# Patient Record
Sex: MALE | Race: WHITE | URBAN - METROPOLITAN AREA
[De-identification: names, ages, dates, MRNs, and addresses within clinical notes are randomized per-mention and may not be internally consistent; named-entity substitution may affect disease eponyms.]

---

## 2017-01-13 ENCOUNTER — APPOINTMENT (RX ONLY)
Dept: URBAN - METROPOLITAN AREA CLINIC 144 | Facility: CLINIC | Age: 67
Setting detail: DERMATOLOGY
End: 2017-01-13

## 2017-01-13 DIAGNOSIS — L82.1 OTHER SEBORRHEIC KERATOSIS: ICD-10-CM

## 2017-01-13 DIAGNOSIS — D22 MELANOCYTIC NEVI: ICD-10-CM

## 2017-01-13 DIAGNOSIS — D18.0 HEMANGIOMA: ICD-10-CM

## 2017-01-13 PROBLEM — D23.62 OTHER BENIGN NEOPLASM OF SKIN OF LEFT UPPER LIMB, INCLUDING SHOULDER: Status: ACTIVE | Noted: 2017-01-13

## 2017-01-13 PROBLEM — D48.5 NEOPLASM OF UNCERTAIN BEHAVIOR OF SKIN: Status: ACTIVE | Noted: 2017-01-13

## 2017-01-13 PROBLEM — D22.71 MELANOCYTIC NEVI OF RIGHT LOWER LIMB, INCLUDING HIP: Status: ACTIVE | Noted: 2017-01-13

## 2017-01-13 PROBLEM — D22.5 MELANOCYTIC NEVI OF TRUNK: Status: ACTIVE | Noted: 2017-01-13

## 2017-01-13 PROBLEM — D18.01 HEMANGIOMA OF SKIN AND SUBCUTANEOUS TISSUE: Status: ACTIVE | Noted: 2017-01-13

## 2017-01-13 PROCEDURE — 11100: CPT

## 2017-01-13 PROCEDURE — 99213 OFFICE O/P EST LOW 20 MIN: CPT | Mod: 25

## 2017-01-13 PROCEDURE — ? COUNSELING

## 2017-01-13 PROCEDURE — ? BIOPSY BY SHAVE METHOD

## 2017-01-13 ASSESSMENT — LOCATION SIMPLE DESCRIPTION DERM
LOCATION SIMPLE: RIGHT THIGH
LOCATION SIMPLE: CHEST
LOCATION SIMPLE: RIGHT UPPER BACK
LOCATION SIMPLE: ABDOMEN

## 2017-01-13 ASSESSMENT — LOCATION DETAILED DESCRIPTION DERM
LOCATION DETAILED: RIGHT ANTERIOR MEDIAL PROXIMAL THIGH
LOCATION DETAILED: RIGHT LATERAL SUPERIOR CHEST
LOCATION DETAILED: LEFT LATERAL INFERIOR CHEST
LOCATION DETAILED: RIGHT SUPERIOR UPPER BACK
LOCATION DETAILED: EPIGASTRIC SKIN

## 2017-01-13 ASSESSMENT — LOCATION ZONE DERM
LOCATION ZONE: LEG
LOCATION ZONE: TRUNK

## 2017-01-13 NOTE — PROCEDURE: BIOPSY BY SHAVE METHOD
Consent: Verbal consent was obtained and risks were reviewed including but not limited to scarring, infection, bleeding, scabbing, incomplete removal, nerve damage and allergy to anesthesia.
Wound Care: Vaseline
Size Of Lesion In Cm: 0
Lab: [id number]
Billing Type: Third-Party Bill
Notification Instructions: Patient will be notified of biopsy results. However, patient instructed to call the office if not contacted within 2 weeks.
Dressing: bandage
Electrodesiccation Text: The wound bed was treated with electrodesiccation after the biopsy was performed.
Biopsy Method: Dermablade
Destruction After The Procedure: Yes
Cryotherapy Text: The wound bed was treated with cryotherapy after the biopsy was performed.
Lab Facility: [id number]
Detail Level: Detailed
Anesthesia Volume In Cc (Will Not Render If 0): 1
Post-Care Instructions: I reviewed with the patient in detail post-care instructions. Patient is to keep the biopsy site dry overnight, and then apply vaseline twice daily until healed.
Silver Nitrate Text: The wound bed was treated with silver nitrate after the biopsy was performed.
Biopsy Type: H and E
Anesthesia Type: 1% lidocaine with epinephrine
Hemostasis: Aluminum Chloride
Electrodesiccation And Curettage Text: The wound bed was treated with electrodesiccation and curettage after the biopsy was performed.
Type Of Destruction Used: Curettage
Bill 10606 For Specimen Handling/Conveyance To Laboratory?: no

## 2017-03-06 VITALS
SYSTOLIC BLOOD PRESSURE: 146 MMHG | DIASTOLIC BLOOD PRESSURE: 86 MMHG | SYSTOLIC BLOOD PRESSURE: 146 MMHG | DIASTOLIC BLOOD PRESSURE: 86 MMHG | DIASTOLIC BLOOD PRESSURE: 86 MMHG | DIASTOLIC BLOOD PRESSURE: 86 MMHG | SYSTOLIC BLOOD PRESSURE: 146 MMHG | DIASTOLIC BLOOD PRESSURE: 86 MMHG | SYSTOLIC BLOOD PRESSURE: 146 MMHG | DIASTOLIC BLOOD PRESSURE: 86 MMHG | DIASTOLIC BLOOD PRESSURE: 86 MMHG | DIASTOLIC BLOOD PRESSURE: 86 MMHG | SYSTOLIC BLOOD PRESSURE: 146 MMHG | DIASTOLIC BLOOD PRESSURE: 86 MMHG | DIASTOLIC BLOOD PRESSURE: 86 MMHG | SYSTOLIC BLOOD PRESSURE: 146 MMHG | SYSTOLIC BLOOD PRESSURE: 146 MMHG | SYSTOLIC BLOOD PRESSURE: 146 MMHG | DIASTOLIC BLOOD PRESSURE: 86 MMHG | SYSTOLIC BLOOD PRESSURE: 146 MMHG | SYSTOLIC BLOOD PRESSURE: 146 MMHG | DIASTOLIC BLOOD PRESSURE: 86 MMHG | SYSTOLIC BLOOD PRESSURE: 146 MMHG | SYSTOLIC BLOOD PRESSURE: 146 MMHG | SYSTOLIC BLOOD PRESSURE: 146 MMHG | SYSTOLIC BLOOD PRESSURE: 146 MMHG | SYSTOLIC BLOOD PRESSURE: 146 MMHG | SYSTOLIC BLOOD PRESSURE: 146 MMHG | SYSTOLIC BLOOD PRESSURE: 146 MMHG | SYSTOLIC BLOOD PRESSURE: 146 MMHG | SYSTOLIC BLOOD PRESSURE: 146 MMHG | DIASTOLIC BLOOD PRESSURE: 86 MMHG | DIASTOLIC BLOOD PRESSURE: 86 MMHG | DIASTOLIC BLOOD PRESSURE: 86 MMHG | DIASTOLIC BLOOD PRESSURE: 86 MMHG | DIASTOLIC BLOOD PRESSURE: 86 MMHG | DIASTOLIC BLOOD PRESSURE: 86 MMHG | SYSTOLIC BLOOD PRESSURE: 146 MMHG | DIASTOLIC BLOOD PRESSURE: 86 MMHG | DIASTOLIC BLOOD PRESSURE: 86 MMHG | SYSTOLIC BLOOD PRESSURE: 146 MMHG | DIASTOLIC BLOOD PRESSURE: 86 MMHG | SYSTOLIC BLOOD PRESSURE: 146 MMHG | SYSTOLIC BLOOD PRESSURE: 146 MMHG | DIASTOLIC BLOOD PRESSURE: 86 MMHG | DIASTOLIC BLOOD PRESSURE: 86 MMHG

## 2017-06-29 ENCOUNTER — HOSPITAL ENCOUNTER (OUTPATIENT)
Dept: HOSPITAL 61 - PMGWOUND | Age: 67
Discharge: HOME | End: 2017-06-29
Attending: EMERGENCY MEDICINE
Payer: MEDICARE

## 2017-06-29 DIAGNOSIS — I70.232: Primary | ICD-10-CM

## 2017-06-29 DIAGNOSIS — Z72.89: ICD-10-CM

## 2017-06-29 DIAGNOSIS — L97.211: ICD-10-CM

## 2017-06-29 DIAGNOSIS — Z87.891: ICD-10-CM

## 2017-06-29 DIAGNOSIS — Z85.46: ICD-10-CM

## 2017-06-29 DIAGNOSIS — J45.909: ICD-10-CM

## 2017-06-29 DIAGNOSIS — Z86.718: ICD-10-CM

## 2017-06-29 DIAGNOSIS — K21.9: ICD-10-CM

## 2017-06-29 PROCEDURE — 97597 DBRDMT OPN WND 1ST 20 CM/<: CPT

## 2017-07-06 ENCOUNTER — HOSPITAL ENCOUNTER (OUTPATIENT)
Dept: HOSPITAL 61 - PMGWOUND | Age: 67
Discharge: HOME | End: 2017-07-06
Attending: EMERGENCY MEDICINE
Payer: MEDICARE

## 2017-07-06 DIAGNOSIS — K21.9: ICD-10-CM

## 2017-07-06 DIAGNOSIS — L97.211: ICD-10-CM

## 2017-07-06 DIAGNOSIS — Z85.46: ICD-10-CM

## 2017-07-06 DIAGNOSIS — Z86.718: ICD-10-CM

## 2017-07-06 DIAGNOSIS — Z72.89: ICD-10-CM

## 2017-07-06 DIAGNOSIS — I87.311: Primary | ICD-10-CM

## 2017-07-06 DIAGNOSIS — J45.909: ICD-10-CM

## 2017-07-06 DIAGNOSIS — Z87.891: ICD-10-CM

## 2017-07-06 PROCEDURE — 97597 DBRDMT OPN WND 1ST 20 CM/<: CPT

## 2017-07-13 ENCOUNTER — HOSPITAL ENCOUNTER (OUTPATIENT)
Dept: HOSPITAL 61 - PMGWOUND | Age: 67
Discharge: HOME | End: 2017-07-13
Attending: EMERGENCY MEDICINE
Payer: MEDICARE

## 2017-07-13 DIAGNOSIS — Z85.46: ICD-10-CM

## 2017-07-13 DIAGNOSIS — Z72.89: ICD-10-CM

## 2017-07-13 DIAGNOSIS — Z87.891: ICD-10-CM

## 2017-07-13 DIAGNOSIS — L97.211: ICD-10-CM

## 2017-07-13 DIAGNOSIS — K21.9: ICD-10-CM

## 2017-07-13 DIAGNOSIS — I87.311: Primary | ICD-10-CM

## 2017-07-13 DIAGNOSIS — J45.909: ICD-10-CM

## 2017-07-13 DIAGNOSIS — Z86.718: ICD-10-CM

## 2017-07-13 PROCEDURE — 11100: CPT

## 2017-07-13 PROCEDURE — 88305 TISSUE EXAM BY PATHOLOGIST: CPT

## 2017-07-14 NOTE — PATHOLOGY
PATHOLOGY REPORT 

 

*    *    *    *    *    *    *    * 

 FINAL DIAGNOSIS:

Skin "right lower leg wound":

- Chronic stasis dermatitis, irritated.

- There is no evidence of malignancy.

-  see comment.

 

COMMENT:

This case was also reviewed by the dermatopathologist Dr. Suki Cox.  

([facility]:pit; 2017)

 

 

REPORT ELECTRONICALLY SIGNED BY:   Rojas Barajas M.D.

DATE/TIME:   2017 13:49

*    *    *    *    *    *    *    *

 

GROSS PATHOLOGY:

Received in formalin labeled "Marty Price, right lower leg biopsy,"

is a punch biopsy measuring 0.3 x 0.3 x 0.4 cm in greatest

dimensions.  The epidermal surface gray tan and flat.  The margin is

inked, and the specimen is entirely submitted in cassette A1.

([facility]; 17)

 

 

 INITIAL CPT CODE(S):

A; 16713

Professional services performed by [facility] at 

[place] [address]

 

  Technical services performed by [facility] at [address] [place] [address]

[address] [address].

  

 SPECIMEN(S) RECEIVED:

A.Right lower leg wound

 

 CLINICAL HISTORY:

None Provided

 

 PATIENT:  MARTY PRICE

/AGE:  1950 (Age: 66)  

PATIENT #:  [id number]

ACCESSION #:  [id number]          ALT CASE #:   

SPECIMEN COLLECTION DATE:  2017

SPECIMEN RECEIVED DATE:  2017

   

[facility] - [address] [place] [address] - PHONE: 

[phone]

* * *  END OF REPORT  * * *

## 2017-07-18 ENCOUNTER — HOSPITAL ENCOUNTER (OUTPATIENT)
Dept: HOSPITAL 61 - PMGWOUND | Age: 67
Discharge: HOME | End: 2017-07-18
Attending: EMERGENCY MEDICINE
Payer: MEDICARE

## 2017-07-18 DIAGNOSIS — I87.311: Primary | ICD-10-CM

## 2017-07-18 DIAGNOSIS — Z87.891: ICD-10-CM

## 2017-07-18 DIAGNOSIS — L97.211: ICD-10-CM

## 2017-07-18 DIAGNOSIS — Z85.46: ICD-10-CM

## 2017-07-18 DIAGNOSIS — K21.9: ICD-10-CM

## 2017-07-18 DIAGNOSIS — Z72.89: ICD-10-CM

## 2017-07-18 DIAGNOSIS — J45.909: ICD-10-CM

## 2017-07-18 DIAGNOSIS — Z86.718: ICD-10-CM

## 2017-07-18 PROCEDURE — 11042 DBRDMT SUBQ TIS 1ST 20SQCM/<: CPT

## 2017-08-15 ENCOUNTER — HOSPITAL ENCOUNTER (OUTPATIENT)
Dept: HOSPITAL 61 - PMGWOUND | Age: 67
Discharge: HOME | End: 2017-08-15
Attending: EMERGENCY MEDICINE
Payer: MEDICARE

## 2017-08-15 DIAGNOSIS — K21.9: ICD-10-CM

## 2017-08-15 DIAGNOSIS — Z87.891: ICD-10-CM

## 2017-08-15 DIAGNOSIS — Z86.718: ICD-10-CM

## 2017-08-15 DIAGNOSIS — L97.211: ICD-10-CM

## 2017-08-15 DIAGNOSIS — J45.909: ICD-10-CM

## 2017-08-15 DIAGNOSIS — Z72.89: ICD-10-CM

## 2017-08-15 DIAGNOSIS — I87.311: Primary | ICD-10-CM

## 2017-08-15 PROCEDURE — 11042 DBRDMT SUBQ TIS 1ST 20SQCM/<: CPT

## 2017-08-22 ENCOUNTER — HOSPITAL ENCOUNTER (OUTPATIENT)
Dept: HOSPITAL 61 - PMGWOUND | Age: 67
Discharge: HOME | End: 2017-08-22
Attending: EMERGENCY MEDICINE
Payer: MEDICARE

## 2017-08-22 DIAGNOSIS — L97.211: ICD-10-CM

## 2017-08-22 DIAGNOSIS — K21.9: ICD-10-CM

## 2017-08-22 DIAGNOSIS — I87.311: Primary | ICD-10-CM

## 2017-08-22 DIAGNOSIS — Z85.46: ICD-10-CM

## 2017-08-22 DIAGNOSIS — J45.909: ICD-10-CM

## 2017-08-22 DIAGNOSIS — Z87.891: ICD-10-CM

## 2017-08-22 DIAGNOSIS — Z86.718: ICD-10-CM

## 2017-08-22 DIAGNOSIS — Z72.89: ICD-10-CM

## 2017-08-22 PROCEDURE — 97597 DBRDMT OPN WND 1ST 20 CM/<: CPT

## 2017-08-29 ENCOUNTER — HOSPITAL ENCOUNTER (OUTPATIENT)
Dept: HOSPITAL 61 - PMGWOUND | Age: 67
Discharge: HOME | End: 2017-08-29
Attending: EMERGENCY MEDICINE
Payer: MEDICARE

## 2017-08-29 DIAGNOSIS — I87.311: Primary | ICD-10-CM

## 2017-08-29 DIAGNOSIS — L97.211: ICD-10-CM

## 2017-08-29 DIAGNOSIS — Z87.891: ICD-10-CM

## 2017-08-29 DIAGNOSIS — J45.909: ICD-10-CM

## 2017-08-29 DIAGNOSIS — Z86.718: ICD-10-CM

## 2017-08-29 DIAGNOSIS — Z72.89: ICD-10-CM

## 2017-08-29 DIAGNOSIS — K21.9: ICD-10-CM

## 2017-08-29 DIAGNOSIS — Z85.46: ICD-10-CM

## 2017-08-29 PROCEDURE — 11042 DBRDMT SUBQ TIS 1ST 20SQCM/<: CPT

## 2017-09-05 ENCOUNTER — HOSPITAL ENCOUNTER (OUTPATIENT)
Dept: HOSPITAL 61 - PMGWOUND | Age: 67
Discharge: HOME | End: 2017-09-05
Attending: EMERGENCY MEDICINE
Payer: MEDICARE

## 2017-09-05 DIAGNOSIS — L97.211: ICD-10-CM

## 2017-09-05 DIAGNOSIS — I87.311: Primary | ICD-10-CM

## 2017-09-05 DIAGNOSIS — Z72.89: ICD-10-CM

## 2017-09-05 DIAGNOSIS — K21.9: ICD-10-CM

## 2017-09-05 DIAGNOSIS — Z87.891: ICD-10-CM

## 2017-09-05 DIAGNOSIS — Z85.46: ICD-10-CM

## 2017-09-05 DIAGNOSIS — J45.909: ICD-10-CM

## 2017-09-05 DIAGNOSIS — Z86.718: ICD-10-CM

## 2017-09-05 PROCEDURE — 97597 DBRDMT OPN WND 1ST 20 CM/<: CPT

## 2017-09-12 ENCOUNTER — HOSPITAL ENCOUNTER (OUTPATIENT)
Dept: HOSPITAL 61 - PMGWOUND | Age: 67
Discharge: HOME | End: 2017-09-12
Attending: EMERGENCY MEDICINE
Payer: MEDICARE

## 2017-09-12 DIAGNOSIS — L97.211: ICD-10-CM

## 2017-09-12 DIAGNOSIS — K21.9: ICD-10-CM

## 2017-09-12 DIAGNOSIS — J45.909: ICD-10-CM

## 2017-09-12 DIAGNOSIS — Z86.718: ICD-10-CM

## 2017-09-12 DIAGNOSIS — Z87.891: ICD-10-CM

## 2017-09-12 DIAGNOSIS — Z72.89: ICD-10-CM

## 2017-09-12 DIAGNOSIS — I87.311: Primary | ICD-10-CM

## 2017-09-12 DIAGNOSIS — Z85.46: ICD-10-CM

## 2017-09-12 PROCEDURE — 97597 DBRDMT OPN WND 1ST 20 CM/<: CPT

## 2017-09-19 ENCOUNTER — HOSPITAL ENCOUNTER (OUTPATIENT)
Dept: HOSPITAL 61 - PMGWOUND | Age: 67
Discharge: HOME | End: 2017-09-19
Attending: EMERGENCY MEDICINE
Payer: MEDICARE

## 2017-09-19 DIAGNOSIS — K21.9: ICD-10-CM

## 2017-09-19 DIAGNOSIS — Z86.718: ICD-10-CM

## 2017-09-19 DIAGNOSIS — I87.311: Primary | ICD-10-CM

## 2017-09-19 DIAGNOSIS — Z72.89: ICD-10-CM

## 2017-09-19 DIAGNOSIS — Z85.46: ICD-10-CM

## 2017-09-19 DIAGNOSIS — L97.211: ICD-10-CM

## 2017-09-19 DIAGNOSIS — Z87.891: ICD-10-CM

## 2017-09-19 DIAGNOSIS — J45.909: ICD-10-CM

## 2017-09-19 PROCEDURE — 99214 OFFICE O/P EST MOD 30 MIN: CPT

## 2017-09-26 ENCOUNTER — HOSPITAL ENCOUNTER (OUTPATIENT)
Dept: HOSPITAL 61 - PMGWOUND | Age: 67
Discharge: HOME | End: 2017-09-26
Attending: EMERGENCY MEDICINE
Payer: MEDICARE

## 2017-09-26 DIAGNOSIS — J45.909: ICD-10-CM

## 2017-09-26 DIAGNOSIS — Z86.718: ICD-10-CM

## 2017-09-26 DIAGNOSIS — Z85.46: ICD-10-CM

## 2017-09-26 DIAGNOSIS — K21.9: ICD-10-CM

## 2017-09-26 DIAGNOSIS — Z87.891: ICD-10-CM

## 2017-09-26 DIAGNOSIS — I87.311: Primary | ICD-10-CM

## 2017-09-26 DIAGNOSIS — L97.211: ICD-10-CM

## 2017-09-26 PROCEDURE — 97597 DBRDMT OPN WND 1ST 20 CM/<: CPT

## 2017-10-03 ENCOUNTER — HOSPITAL ENCOUNTER (OUTPATIENT)
Dept: HOSPITAL 61 - PMGWOUND | Age: 67
Discharge: HOME | End: 2017-10-03
Attending: EMERGENCY MEDICINE
Payer: MEDICARE

## 2017-10-03 DIAGNOSIS — K21.9: ICD-10-CM

## 2017-10-03 DIAGNOSIS — Z86.718: ICD-10-CM

## 2017-10-03 DIAGNOSIS — Z72.89: ICD-10-CM

## 2017-10-03 DIAGNOSIS — L97.211: ICD-10-CM

## 2017-10-03 DIAGNOSIS — Z87.891: ICD-10-CM

## 2017-10-03 DIAGNOSIS — J45.909: ICD-10-CM

## 2017-10-03 DIAGNOSIS — I87.311: Primary | ICD-10-CM

## 2017-10-03 DIAGNOSIS — Z85.46: ICD-10-CM

## 2017-10-03 PROCEDURE — 29581 APPL MULTLAYER CMPRN SYS LEG: CPT

## 2017-10-03 PROCEDURE — 97597 DBRDMT OPN WND 1ST 20 CM/<: CPT

## 2017-10-06 ENCOUNTER — HOSPITAL ENCOUNTER (OUTPATIENT)
Dept: HOSPITAL 61 - PMGWOUND | Age: 67
Discharge: HOME | End: 2017-10-06
Attending: FAMILY MEDICINE
Payer: MEDICARE

## 2017-10-06 DIAGNOSIS — Z87.891: ICD-10-CM

## 2017-10-06 DIAGNOSIS — Z85.46: ICD-10-CM

## 2017-10-06 DIAGNOSIS — K21.9: ICD-10-CM

## 2017-10-06 DIAGNOSIS — L97.211: ICD-10-CM

## 2017-10-06 DIAGNOSIS — Z86.718: ICD-10-CM

## 2017-10-06 DIAGNOSIS — J45.909: ICD-10-CM

## 2017-10-06 DIAGNOSIS — I87.311: Primary | ICD-10-CM

## 2017-10-06 PROCEDURE — 99214 OFFICE O/P EST MOD 30 MIN: CPT

## 2017-10-12 ENCOUNTER — HOSPITAL ENCOUNTER (OUTPATIENT)
Dept: HOSPITAL 61 - PMGWOUND | Age: 67
Discharge: HOME | End: 2017-10-12
Attending: EMERGENCY MEDICINE
Payer: MEDICARE

## 2017-10-12 DIAGNOSIS — L97.211: ICD-10-CM

## 2017-10-12 DIAGNOSIS — Z87.891: ICD-10-CM

## 2017-10-12 DIAGNOSIS — Z72.89: ICD-10-CM

## 2017-10-12 DIAGNOSIS — Z86.718: ICD-10-CM

## 2017-10-12 DIAGNOSIS — K21.9: ICD-10-CM

## 2017-10-12 DIAGNOSIS — I87.311: Primary | ICD-10-CM

## 2017-10-12 DIAGNOSIS — J45.909: ICD-10-CM

## 2017-10-12 PROCEDURE — 99214 OFFICE O/P EST MOD 30 MIN: CPT

## 2017-10-31 ENCOUNTER — HOSPITAL ENCOUNTER (OUTPATIENT)
Dept: HOSPITAL 61 - PMGWOUND | Age: 67
Discharge: HOME | End: 2017-10-31
Attending: EMERGENCY MEDICINE
Payer: MEDICARE

## 2017-10-31 DIAGNOSIS — Z72.89: ICD-10-CM

## 2017-10-31 DIAGNOSIS — Z86.718: ICD-10-CM

## 2017-10-31 DIAGNOSIS — K21.9: ICD-10-CM

## 2017-10-31 DIAGNOSIS — J45.909: ICD-10-CM

## 2017-10-31 DIAGNOSIS — Z85.46: ICD-10-CM

## 2017-10-31 DIAGNOSIS — L97.211: ICD-10-CM

## 2017-10-31 DIAGNOSIS — I87.311: Primary | ICD-10-CM

## 2017-10-31 DIAGNOSIS — Z87.891: ICD-10-CM

## 2017-10-31 PROCEDURE — 99214 OFFICE O/P EST MOD 30 MIN: CPT

## 2017-11-21 ENCOUNTER — HOSPITAL ENCOUNTER (OUTPATIENT)
Dept: HOSPITAL 61 - PMGWOUND | Age: 67
Discharge: HOME | End: 2017-11-21
Attending: EMERGENCY MEDICINE
Payer: MEDICARE

## 2017-11-21 DIAGNOSIS — Z86.718: ICD-10-CM

## 2017-11-21 DIAGNOSIS — L97.211: ICD-10-CM

## 2017-11-21 DIAGNOSIS — J45.909: ICD-10-CM

## 2017-11-21 DIAGNOSIS — K21.9: ICD-10-CM

## 2017-11-21 DIAGNOSIS — I87.311: Primary | ICD-10-CM

## 2017-11-21 DIAGNOSIS — Z85.46: ICD-10-CM

## 2017-11-21 DIAGNOSIS — Z87.891: ICD-10-CM

## 2017-11-21 DIAGNOSIS — Z72.89: ICD-10-CM

## 2017-11-21 PROCEDURE — 99214 OFFICE O/P EST MOD 30 MIN: CPT

## 2017-11-28 ENCOUNTER — HOSPITAL ENCOUNTER (OUTPATIENT)
Dept: HOSPITAL 61 - PMGWOUND | Age: 67
Discharge: HOME | End: 2017-11-28
Attending: EMERGENCY MEDICINE
Payer: MEDICARE

## 2017-11-28 DIAGNOSIS — K21.9: ICD-10-CM

## 2017-11-28 DIAGNOSIS — Z85.46: ICD-10-CM

## 2017-11-28 DIAGNOSIS — L97.211: ICD-10-CM

## 2017-11-28 DIAGNOSIS — Z87.891: ICD-10-CM

## 2017-11-28 DIAGNOSIS — Z86.718: ICD-10-CM

## 2017-11-28 DIAGNOSIS — Z72.89: ICD-10-CM

## 2017-11-28 DIAGNOSIS — J45.909: ICD-10-CM

## 2017-11-28 DIAGNOSIS — I87.311: Primary | ICD-10-CM

## 2017-11-28 PROCEDURE — 97597 DBRDMT OPN WND 1ST 20 CM/<: CPT

## 2017-12-05 ENCOUNTER — HOSPITAL ENCOUNTER (OUTPATIENT)
Dept: HOSPITAL 61 - PMGWOUND | Age: 67
Discharge: HOME | End: 2017-12-05
Attending: EMERGENCY MEDICINE
Payer: MEDICARE

## 2017-12-05 DIAGNOSIS — Z86.718: ICD-10-CM

## 2017-12-05 DIAGNOSIS — Z85.46: ICD-10-CM

## 2017-12-05 DIAGNOSIS — K21.9: ICD-10-CM

## 2017-12-05 DIAGNOSIS — J45.909: ICD-10-CM

## 2017-12-05 DIAGNOSIS — Z90.49: ICD-10-CM

## 2017-12-05 DIAGNOSIS — Z72.89: ICD-10-CM

## 2017-12-05 DIAGNOSIS — L97.211: ICD-10-CM

## 2017-12-05 DIAGNOSIS — Z87.891: ICD-10-CM

## 2017-12-05 DIAGNOSIS — I87.311: Primary | ICD-10-CM

## 2017-12-05 PROCEDURE — 11042 DBRDMT SUBQ TIS 1ST 20SQCM/<: CPT

## 2017-12-12 ENCOUNTER — HOSPITAL ENCOUNTER (OUTPATIENT)
Dept: HOSPITAL 61 - PMGWOUND | Age: 67
Discharge: HOME | End: 2017-12-12
Attending: EMERGENCY MEDICINE
Payer: MEDICARE

## 2017-12-12 DIAGNOSIS — Z85.46: ICD-10-CM

## 2017-12-12 DIAGNOSIS — Z72.89: ICD-10-CM

## 2017-12-12 DIAGNOSIS — J45.909: ICD-10-CM

## 2017-12-12 DIAGNOSIS — Z87.891: ICD-10-CM

## 2017-12-12 DIAGNOSIS — Z86.718: ICD-10-CM

## 2017-12-12 DIAGNOSIS — L97.211: ICD-10-CM

## 2017-12-12 DIAGNOSIS — I87.311: Primary | ICD-10-CM

## 2017-12-12 DIAGNOSIS — Z90.49: ICD-10-CM

## 2017-12-12 DIAGNOSIS — K21.9: ICD-10-CM

## 2017-12-12 PROCEDURE — 99214 OFFICE O/P EST MOD 30 MIN: CPT

## 2017-12-19 ENCOUNTER — HOSPITAL ENCOUNTER (OUTPATIENT)
Dept: HOSPITAL 61 - PMGWOUND | Age: 67
Discharge: HOME | End: 2017-12-19
Attending: EMERGENCY MEDICINE
Payer: MEDICARE

## 2017-12-19 DIAGNOSIS — J45.909: ICD-10-CM

## 2017-12-19 DIAGNOSIS — I87.311: Primary | ICD-10-CM

## 2017-12-19 DIAGNOSIS — L97.211: ICD-10-CM

## 2017-12-19 DIAGNOSIS — Z72.89: ICD-10-CM

## 2017-12-19 DIAGNOSIS — Z85.46: ICD-10-CM

## 2017-12-19 DIAGNOSIS — Z87.891: ICD-10-CM

## 2017-12-19 DIAGNOSIS — Z86.718: ICD-10-CM

## 2017-12-19 DIAGNOSIS — K21.9: ICD-10-CM

## 2017-12-19 DIAGNOSIS — Z90.49: ICD-10-CM

## 2017-12-19 PROCEDURE — 97597 DBRDMT OPN WND 1ST 20 CM/<: CPT

## 2018-01-02 ENCOUNTER — HOSPITAL ENCOUNTER (OUTPATIENT)
Dept: HOSPITAL 61 - PMGWOUND | Age: 68
Discharge: HOME | End: 2018-01-02
Attending: EMERGENCY MEDICINE
Payer: MEDICARE

## 2018-01-02 DIAGNOSIS — K21.9: ICD-10-CM

## 2018-01-02 DIAGNOSIS — Z72.89: ICD-10-CM

## 2018-01-02 DIAGNOSIS — I87.311: Primary | ICD-10-CM

## 2018-01-02 DIAGNOSIS — Z86.718: ICD-10-CM

## 2018-01-02 DIAGNOSIS — L97.211: ICD-10-CM

## 2018-01-02 DIAGNOSIS — Z87.891: ICD-10-CM

## 2018-01-02 DIAGNOSIS — Z85.46: ICD-10-CM

## 2018-01-02 DIAGNOSIS — J45.909: ICD-10-CM

## 2018-01-02 PROCEDURE — 99214 OFFICE O/P EST MOD 30 MIN: CPT

## 2018-01-15 ENCOUNTER — HOSPITAL ENCOUNTER (OUTPATIENT)
Dept: HOSPITAL 61 - PMGWOUND | Age: 68
Discharge: HOME | End: 2018-01-15
Attending: EMERGENCY MEDICINE
Payer: MEDICARE

## 2018-01-15 DIAGNOSIS — I87.311: Primary | ICD-10-CM

## 2018-01-15 DIAGNOSIS — L97.211: ICD-10-CM

## 2018-01-15 DIAGNOSIS — Z72.89: ICD-10-CM

## 2018-01-15 DIAGNOSIS — Z86.718: ICD-10-CM

## 2018-01-15 DIAGNOSIS — J45.909: ICD-10-CM

## 2018-01-15 DIAGNOSIS — Z85.46: ICD-10-CM

## 2018-01-15 DIAGNOSIS — Z87.891: ICD-10-CM

## 2018-01-15 DIAGNOSIS — K21.9: ICD-10-CM

## 2018-01-15 PROCEDURE — 99214 OFFICE O/P EST MOD 30 MIN: CPT

## 2018-01-22 ENCOUNTER — HOSPITAL ENCOUNTER (OUTPATIENT)
Dept: HOSPITAL 61 - PMGWOUND | Age: 68
Discharge: HOME | End: 2018-01-22
Attending: EMERGENCY MEDICINE
Payer: MEDICARE

## 2018-01-22 DIAGNOSIS — Z86.718: ICD-10-CM

## 2018-01-22 DIAGNOSIS — Z85.46: ICD-10-CM

## 2018-01-22 DIAGNOSIS — L97.211: ICD-10-CM

## 2018-01-22 DIAGNOSIS — K21.9: ICD-10-CM

## 2018-01-22 DIAGNOSIS — Z72.89: ICD-10-CM

## 2018-01-22 DIAGNOSIS — Z87.891: ICD-10-CM

## 2018-01-22 DIAGNOSIS — J45.909: ICD-10-CM

## 2018-01-22 DIAGNOSIS — I87.311: Primary | ICD-10-CM

## 2018-01-22 PROCEDURE — 99213 OFFICE O/P EST LOW 20 MIN: CPT

## 2018-01-30 ENCOUNTER — HOSPITAL ENCOUNTER (OUTPATIENT)
Dept: HOSPITAL 61 - PMGWOUND | Age: 68
Discharge: HOME | End: 2018-01-30
Attending: EMERGENCY MEDICINE
Payer: MEDICARE

## 2018-01-30 DIAGNOSIS — L97.211: ICD-10-CM

## 2018-01-30 DIAGNOSIS — J45.909: ICD-10-CM

## 2018-01-30 DIAGNOSIS — K21.9: ICD-10-CM

## 2018-01-30 DIAGNOSIS — Z87.891: ICD-10-CM

## 2018-01-30 DIAGNOSIS — Z72.89: ICD-10-CM

## 2018-01-30 DIAGNOSIS — Z85.46: ICD-10-CM

## 2018-01-30 DIAGNOSIS — Z86.718: ICD-10-CM

## 2018-01-30 DIAGNOSIS — I87.311: Primary | ICD-10-CM

## 2018-01-30 PROCEDURE — 99214 OFFICE O/P EST MOD 30 MIN: CPT

## 2018-02-13 ENCOUNTER — HOSPITAL ENCOUNTER (OUTPATIENT)
Dept: HOSPITAL 61 - PMGWOUND | Age: 68
Discharge: HOME | End: 2018-02-13
Attending: EMERGENCY MEDICINE
Payer: MEDICARE

## 2018-02-13 DIAGNOSIS — K21.9: ICD-10-CM

## 2018-02-13 DIAGNOSIS — Z85.46: ICD-10-CM

## 2018-02-13 DIAGNOSIS — Z86.718: ICD-10-CM

## 2018-02-13 DIAGNOSIS — Z87.891: ICD-10-CM

## 2018-02-13 DIAGNOSIS — I87.311: Primary | ICD-10-CM

## 2018-02-13 DIAGNOSIS — J45.909: ICD-10-CM

## 2018-02-13 DIAGNOSIS — Z72.89: ICD-10-CM

## 2018-02-13 DIAGNOSIS — L97.211: ICD-10-CM

## 2018-02-13 PROCEDURE — 99214 OFFICE O/P EST MOD 30 MIN: CPT

## 2018-02-26 ENCOUNTER — HOSPITAL ENCOUNTER (OUTPATIENT)
Dept: HOSPITAL 61 - PMGWOUND | Age: 68
Discharge: HOME | End: 2018-02-26
Attending: EMERGENCY MEDICINE
Payer: MEDICARE

## 2018-02-26 DIAGNOSIS — Z86.718: ICD-10-CM

## 2018-02-26 DIAGNOSIS — K21.9: ICD-10-CM

## 2018-02-26 DIAGNOSIS — Z87.891: ICD-10-CM

## 2018-02-26 DIAGNOSIS — J45.909: ICD-10-CM

## 2018-02-26 DIAGNOSIS — L97.211: ICD-10-CM

## 2018-02-26 DIAGNOSIS — Z85.46: ICD-10-CM

## 2018-02-26 DIAGNOSIS — Z72.89: ICD-10-CM

## 2018-02-26 DIAGNOSIS — I87.311: Primary | ICD-10-CM

## 2018-02-26 PROCEDURE — 99214 OFFICE O/P EST MOD 30 MIN: CPT

## 2018-08-03 NOTE — PROCEDURE: COUNSELING
Subjective:      Patient ID: Linh Hameed is a 47 y.o. male. HPI  Well Adult Physical   Patient here for a comprehensive physical exam.The patient reports no problems  Do you take any herbs or supplements that were not prescribed by a doctor? yes Are you taking calcium supplements? no Are you taking aspirin daily? yes   History:  Patient receives prostate care at our clinic  Date last prostate exam: June/2017  Date last PSA: June/2017    Review of Systems   Constitutional: Negative for fatigue. HENT: Negative for sinus pressure. Eyes: Negative for visual disturbance. Respiratory: Negative for shortness of breath. Cardiovascular: Negative for chest pain. Gastrointestinal: Negative for constipation. Genitourinary: Negative. Musculoskeletal: Negative for arthralgias. Skin: Negative for rash. Neurological: Negative for headaches. The patient's medications, allergies, past medical problems, surgical, social, and family histories were reviewed and updated as needed. Objective:   Physical Exam   Constitutional: He is oriented to person, place, and time. He appears well-developed and well-nourished. No distress. HENT:   Head: Normocephalic and atraumatic. Right Ear: External ear normal.   Left Ear: External ear normal.   Nose: Nose normal.   Mouth/Throat: Oropharynx is clear and moist. No oropharyngeal exudate. Eyes: Conjunctivae are normal. No scleral icterus. Neck: Neck supple. Carotid bruit is not present. No tracheal deviation present. No thyromegaly present. Cardiovascular: Normal rate, regular rhythm, normal heart sounds and intact distal pulses. Pulmonary/Chest: Effort normal and breath sounds normal.   Abdominal: Soft. Bowel sounds are normal. He exhibits no mass. Hernia confirmed negative in the right inguinal area and confirmed negative in the left inguinal area.    Genitourinary: Rectum normal, prostate normal, testes normal and penis normal. Rectal exam shows guaiac
Detail Level: Generalized
Detail Level: Detailed
Detail Level: Simple

## 2018-09-10 ENCOUNTER — APPOINTMENT (RX ONLY)
Dept: URBAN - METROPOLITAN AREA CLINIC 61 | Facility: CLINIC | Age: 68
Setting detail: DERMATOLOGY
End: 2018-09-10

## 2018-09-10 DIAGNOSIS — L82.1 OTHER SEBORRHEIC KERATOSIS: ICD-10-CM

## 2018-09-10 DIAGNOSIS — Z71.89 OTHER SPECIFIED COUNSELING: ICD-10-CM

## 2018-09-10 DIAGNOSIS — D22 MELANOCYTIC NEVI: ICD-10-CM

## 2018-09-10 DIAGNOSIS — L30.8 OTHER SPECIFIED DERMATITIS: ICD-10-CM

## 2018-09-10 DIAGNOSIS — L81.8 OTHER SPECIFIED DISORDERS OF PIGMENTATION: ICD-10-CM

## 2018-09-10 DIAGNOSIS — L73.8 OTHER SPECIFIED FOLLICULAR DISORDERS: ICD-10-CM

## 2018-09-10 DIAGNOSIS — I87.2 VENOUS INSUFFICIENCY (CHRONIC) (PERIPHERAL): ICD-10-CM

## 2018-09-10 DIAGNOSIS — L72.0 EPIDERMAL CYST: ICD-10-CM

## 2018-09-10 DIAGNOSIS — L57.8 OTHER SKIN CHANGES DUE TO CHRONIC EXPOSURE TO NONIONIZING RADIATION: ICD-10-CM

## 2018-09-10 DIAGNOSIS — D18.0 HEMANGIOMA: ICD-10-CM

## 2018-09-10 PROBLEM — D23.71 OTHER BENIGN NEOPLASM OF SKIN OF RIGHT LOWER LIMB, INCLUDING HIP: Status: ACTIVE | Noted: 2018-09-10

## 2018-09-10 PROBLEM — D18.01 HEMANGIOMA OF SKIN AND SUBCUTANEOUS TISSUE: Status: ACTIVE | Noted: 2018-09-10

## 2018-09-10 PROBLEM — D22.5 MELANOCYTIC NEVI OF TRUNK: Status: ACTIVE | Noted: 2018-09-10

## 2018-09-10 PROBLEM — D22.71 MELANOCYTIC NEVI OF RIGHT LOWER LIMB, INCLUDING HIP: Status: ACTIVE | Noted: 2018-09-10

## 2018-09-10 PROCEDURE — ? COUNSELING

## 2018-09-10 PROCEDURE — ? PRESCRIPTION

## 2018-09-10 PROCEDURE — ? TREATMENT REGIMEN

## 2018-09-10 PROCEDURE — 99214 OFFICE O/P EST MOD 30 MIN: CPT

## 2018-09-10 RX ORDER — CLOBETASOL PROPIONATE 0.5 MG/G
OINTMENT TOPICAL
Qty: 1 | Refills: 1 | Status: ERX

## 2018-09-10 ASSESSMENT — LOCATION DETAILED DESCRIPTION DERM
LOCATION DETAILED: LEFT CENTRAL MALAR CHEEK
LOCATION DETAILED: RIGHT RADIAL DORSAL HAND
LOCATION DETAILED: LEFT MID-UPPER BACK
LOCATION DETAILED: LEFT LATERAL MALAR CHEEK
LOCATION DETAILED: LEFT PROXIMAL DORSAL FOREARM
LOCATION DETAILED: RIGHT MEDIAL SUPERIOR CHEST
LOCATION DETAILED: LEFT INFERIOR UPPER BACK
LOCATION DETAILED: RIGHT POSTERIOR SHOULDER
LOCATION DETAILED: LEFT RADIAL PALM
LOCATION DETAILED: EPIGASTRIC SKIN
LOCATION DETAILED: RIGHT SUPERIOR PARIETAL SCALP
LOCATION DETAILED: LEFT DISTAL PRETIBIAL REGION
LOCATION DETAILED: RIGHT ANTERIOR PROXIMAL THIGH
LOCATION DETAILED: RIGHT INFERIOR UPPER BACK
LOCATION DETAILED: RIGHT DISTAL PRETIBIAL REGION
LOCATION DETAILED: RIGHT PROXIMAL DORSAL FOREARM
LOCATION DETAILED: RIGHT RADIAL PALM
LOCATION DETAILED: LEFT RADIAL DORSAL HAND
LOCATION DETAILED: MID POSTERIOR NECK

## 2018-09-10 ASSESSMENT — LOCATION ZONE DERM
LOCATION ZONE: FACE
LOCATION ZONE: TRUNK
LOCATION ZONE: HAND
LOCATION ZONE: SCALP
LOCATION ZONE: ARM
LOCATION ZONE: LEG
LOCATION ZONE: NECK

## 2018-09-10 ASSESSMENT — LOCATION SIMPLE DESCRIPTION DERM
LOCATION SIMPLE: ABDOMEN
LOCATION SIMPLE: LEFT HAND
LOCATION SIMPLE: CHEST
LOCATION SIMPLE: SCALP
LOCATION SIMPLE: RIGHT THIGH
LOCATION SIMPLE: LEFT PRETIBIAL REGION
LOCATION SIMPLE: RIGHT HAND
LOCATION SIMPLE: RIGHT UPPER BACK
LOCATION SIMPLE: RIGHT SHOULDER
LOCATION SIMPLE: RIGHT FOREARM
LOCATION SIMPLE: LEFT UPPER BACK
LOCATION SIMPLE: RIGHT PRETIBIAL REGION
LOCATION SIMPLE: POSTERIOR NECK
LOCATION SIMPLE: LEFT CHEEK
LOCATION SIMPLE: LEFT FOREARM

## 2018-09-10 ASSESSMENT — SEVERITY ASSESSMENT: SEVERITY: MILD

## 2018-09-10 ASSESSMENT — PAIN INTENSITY VAS: HOW INTENSE IS YOUR PAIN 0 BEING NO PAIN, 10 BEING THE MOST SEVERE PAIN POSSIBLE?: NO PAIN

## 2019-10-07 ENCOUNTER — APPOINTMENT (RX ONLY)
Dept: URBAN - METROPOLITAN AREA CLINIC 61 | Facility: CLINIC | Age: 69
Setting detail: DERMATOLOGY
End: 2019-10-07

## 2019-10-07 DIAGNOSIS — L82.1 OTHER SEBORRHEIC KERATOSIS: ICD-10-CM

## 2019-10-07 DIAGNOSIS — L57.8 OTHER SKIN CHANGES DUE TO CHRONIC EXPOSURE TO NONIONIZING RADIATION: ICD-10-CM

## 2019-10-07 DIAGNOSIS — D22 MELANOCYTIC NEVI: ICD-10-CM

## 2019-10-07 DIAGNOSIS — L73.8 OTHER SPECIFIED FOLLICULAR DISORDERS: ICD-10-CM

## 2019-10-07 DIAGNOSIS — D18.0 HEMANGIOMA: ICD-10-CM

## 2019-10-07 DIAGNOSIS — Z71.89 OTHER SPECIFIED COUNSELING: ICD-10-CM

## 2019-10-07 PROBLEM — D23.72 OTHER BENIGN NEOPLASM OF SKIN OF LEFT LOWER LIMB, INCLUDING HIP: Status: ACTIVE | Noted: 2019-10-07

## 2019-10-07 PROBLEM — D23.5 OTHER BENIGN NEOPLASM OF SKIN OF TRUNK: Status: ACTIVE | Noted: 2019-10-07

## 2019-10-07 PROBLEM — D18.01 HEMANGIOMA OF SKIN AND SUBCUTANEOUS TISSUE: Status: ACTIVE | Noted: 2019-10-07

## 2019-10-07 PROBLEM — D22.5 MELANOCYTIC NEVI OF TRUNK: Status: ACTIVE | Noted: 2019-10-07

## 2019-10-07 PROCEDURE — ? COUNSELING

## 2019-10-07 PROCEDURE — 99214 OFFICE O/P EST MOD 30 MIN: CPT

## 2019-10-07 ASSESSMENT — LOCATION SIMPLE DESCRIPTION DERM
LOCATION SIMPLE: ABDOMEN
LOCATION SIMPLE: LEFT UPPER BACK
LOCATION SIMPLE: RIGHT FOREHEAD
LOCATION SIMPLE: RIGHT PRETIBIAL REGION
LOCATION SIMPLE: POSTERIOR NECK

## 2019-10-07 ASSESSMENT — PAIN INTENSITY VAS: HOW INTENSE IS YOUR PAIN 0 BEING NO PAIN, 10 BEING THE MOST SEVERE PAIN POSSIBLE?: NO PAIN

## 2019-10-07 ASSESSMENT — LOCATION ZONE DERM
LOCATION ZONE: FACE
LOCATION ZONE: TRUNK
LOCATION ZONE: LEG
LOCATION ZONE: NECK

## 2019-10-07 ASSESSMENT — LOCATION DETAILED DESCRIPTION DERM
LOCATION DETAILED: LEFT INFERIOR UPPER BACK
LOCATION DETAILED: RIGHT LATERAL ABDOMEN
LOCATION DETAILED: RIGHT SUPERIOR FOREHEAD
LOCATION DETAILED: LEFT MID-UPPER BACK
LOCATION DETAILED: LEFT POSTERIOR NECK
LOCATION DETAILED: RIGHT PROXIMAL PRETIBIAL REGION

## 2020-10-05 ENCOUNTER — APPOINTMENT (RX ONLY)
Dept: URBAN - METROPOLITAN AREA CLINIC 61 | Facility: CLINIC | Age: 70
Setting detail: DERMATOLOGY
End: 2020-10-05

## 2020-10-05 DIAGNOSIS — L81.4 OTHER MELANIN HYPERPIGMENTATION: ICD-10-CM

## 2020-10-05 DIAGNOSIS — D18.0 HEMANGIOMA: ICD-10-CM

## 2020-10-05 DIAGNOSIS — D22 MELANOCYTIC NEVI: ICD-10-CM

## 2020-10-05 DIAGNOSIS — Z71.89 OTHER SPECIFIED COUNSELING: ICD-10-CM

## 2020-10-05 DIAGNOSIS — L57.8 OTHER SKIN CHANGES DUE TO CHRONIC EXPOSURE TO NONIONIZING RADIATION: ICD-10-CM

## 2020-10-05 DIAGNOSIS — L82.1 OTHER SEBORRHEIC KERATOSIS: ICD-10-CM

## 2020-10-05 PROBLEM — D23.72 OTHER BENIGN NEOPLASM OF SKIN OF LEFT LOWER LIMB, INCLUDING HIP: Status: ACTIVE | Noted: 2020-10-05

## 2020-10-05 PROBLEM — D22.71 MELANOCYTIC NEVI OF RIGHT LOWER LIMB, INCLUDING HIP: Status: ACTIVE | Noted: 2020-10-05

## 2020-10-05 PROBLEM — D23.71 OTHER BENIGN NEOPLASM OF SKIN OF RIGHT LOWER LIMB, INCLUDING HIP: Status: ACTIVE | Noted: 2020-10-05

## 2020-10-05 PROBLEM — D22.5 MELANOCYTIC NEVI OF TRUNK: Status: ACTIVE | Noted: 2020-10-05

## 2020-10-05 PROBLEM — D18.01 HEMANGIOMA OF SKIN AND SUBCUTANEOUS TISSUE: Status: ACTIVE | Noted: 2020-10-05

## 2020-10-05 PROCEDURE — 99214 OFFICE O/P EST MOD 30 MIN: CPT

## 2020-10-05 PROCEDURE — ? COUNSELING

## 2020-10-05 ASSESSMENT — LOCATION SIMPLE DESCRIPTION DERM
LOCATION SIMPLE: CHEST
LOCATION SIMPLE: RIGHT THIGH
LOCATION SIMPLE: ABDOMEN
LOCATION SIMPLE: RIGHT FOREARM
LOCATION SIMPLE: LEFT CHEEK
LOCATION SIMPLE: RIGHT UPPER BACK
LOCATION SIMPLE: LEFT UPPER BACK
LOCATION SIMPLE: LEFT FOREARM

## 2020-10-05 ASSESSMENT — LOCATION DETAILED DESCRIPTION DERM
LOCATION DETAILED: EPIGASTRIC SKIN
LOCATION DETAILED: LEFT CENTRAL MALAR CHEEK
LOCATION DETAILED: RIGHT MEDIAL UPPER BACK
LOCATION DETAILED: RIGHT PROXIMAL DORSAL FOREARM
LOCATION DETAILED: RIGHT ANTERIOR PROXIMAL THIGH
LOCATION DETAILED: LEFT SUPERIOR MEDIAL UPPER BACK
LOCATION DETAILED: PERIUMBILICAL SKIN
LOCATION DETAILED: STERNAL NOTCH
LOCATION DETAILED: LEFT MID-UPPER BACK
LOCATION DETAILED: LEFT PROXIMAL DORSAL FOREARM

## 2020-10-05 ASSESSMENT — PAIN INTENSITY VAS: HOW INTENSE IS YOUR PAIN 0 BEING NO PAIN, 10 BEING THE MOST SEVERE PAIN POSSIBLE?: NO PAIN

## 2020-10-05 ASSESSMENT — LOCATION ZONE DERM
LOCATION ZONE: TRUNK
LOCATION ZONE: LEG
LOCATION ZONE: FACE
LOCATION ZONE: ARM

## 2021-05-11 ENCOUNTER — HOSPITAL ENCOUNTER (OUTPATIENT)
Dept: HOSPITAL 61 - US | Age: 71
End: 2021-05-11
Attending: NURSE PRACTITIONER
Payer: MEDICARE

## 2021-05-11 DIAGNOSIS — K76.0: ICD-10-CM

## 2021-05-11 DIAGNOSIS — N28.1: Primary | ICD-10-CM

## 2021-05-11 PROCEDURE — 76770 US EXAM ABDO BACK WALL COMP: CPT

## 2021-05-11 NOTE — RAD
EXAM: Renal sonogram.



HISTORY: Flank pain.



TECHNIQUE: Sonographic imaging of the kidneys and bladder was performed.



COMPARISON: None.



FINDINGS: The kidneys are normal in size. There is no hydronephrosis. There is a 1.8 cm simple appear
ing right renal cyst. The urinary bladder is unremarkable. The inferior vena cava is partially obscur
ed due to bowel gas. There is incidental hepatic steatosis.



IMPRESSION:

1. 1.3 cm simple appearing right renal cyst.

2. No acute sonographic finding.

3. Hepatic steatosis.



Electronically signed by: Tawana May MD (5/11/2021 2:43 PM) [facility]

## 2021-11-01 ENCOUNTER — HOSPITAL ENCOUNTER (OUTPATIENT)
Dept: HOSPITAL 61 - KCIC | Age: 71
End: 2021-11-01
Attending: FAMILY MEDICINE
Payer: MEDICARE
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
## 2021-11-01 DIAGNOSIS — S76.012A: Primary | ICD-10-CM
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
## 2021-11-01 PROCEDURE — 72170 X-RAY EXAM OF PELVIS: CPT

## 2021-11-01 NOTE — KCIC
EXAM:  XR PELVIS 1-2V 11/1/2021 11:38 AM



CLINICAL INDICATION:  Left hip strain



COMPARISON:  None



TECHNIQUE:  AP view the pelvis and frog-leg lateral view of the left hip



FINDINGS:  No acute fracture. Alignment is normal. No significant degenerative joint disease of the h
ips. Pubic symphysis and sacroiliac joints are unremarkable. Lower lumbar spine is unremarkable.



IMPRESSION:  No acute osseous abnormality.



Electronically signed by: Miriam Gibson MD (11/1/2021 3:35 PM) [id number]

## 2022-02-15 ENCOUNTER — HOSPITAL ENCOUNTER (OUTPATIENT)
Dept: HOSPITAL 61 - KCIC MRI | Age: 72
End: 2022-02-15
Attending: ORTHOPAEDIC SURGERY
Payer: MEDICARE

## 2022-02-15 DIAGNOSIS — D18.09: ICD-10-CM

## 2022-02-15 DIAGNOSIS — M48.07: ICD-10-CM

## 2022-02-15 DIAGNOSIS — M51.46: ICD-10-CM

## 2022-02-15 DIAGNOSIS — M47.816: Primary | ICD-10-CM

## 2022-02-15 DIAGNOSIS — M48.8X7: ICD-10-CM

## 2022-02-15 DIAGNOSIS — M43.16: ICD-10-CM

## 2022-02-15 DIAGNOSIS — M51.27: ICD-10-CM

## 2022-02-15 DIAGNOSIS — M25.552: ICD-10-CM

## 2022-02-15 PROCEDURE — 72148 MRI LUMBAR SPINE W/O DYE: CPT

## 2022-02-15 NOTE — KCIC
EXAM: Lumbar spine MRI without contrast.



HISTORY: Hip pain.



TECHNIQUE: Multiplanar, multisequence magnetic resonance imaging of the lumbar spine was performed wi
thout contrast.



COMPARISON: 3/2/2018



FINDINGS: There is mild lumbar scoliosis. There is 2 mL retrolisthesis of L2 on L3 and 2 mm grade 1 a
nterolisthesis of L4 on L5. There is multilevel endplate remodeling. There are few endplate Schmorl's
 nodes. There are few osseous hemangiomas. There is disc desiccation at L2-S1. The conus terminates a
t L1.



At L1-L2, there is no stenosis.



At L2-L3, there is a left lateral recess disc protrusion with 6 mmHg inferior extrusion superimposed 
on a disc bulge and endplate remodeling. There is mild bilateral facet arthropathy. There is mild ret
rolisthesis. There is mild right and moderate to severe left foraminal stenosis. There is effacement 
of the left lateral recess and mild central canal stenosis.



At L3-L4, there is a disc bulge and endplate remodeling. There is moderate to severe bilateral facet 
arthropathy. There is prominent dorsal epidural fat. There is mild left foraminal stenosis. There is 
mild central canal stenosis.



At L4-L5, there is a left foraminal disc osteophyte complex superimposed on a disc bulge and endplate
 remodeling. There is severe bilateral facet arthropathy. There is minimal grade 1 anterolisthesis. T
here is severe right and moderate left foraminal stenosis. There is moderate central canal stenosis.



At L5-S1, there is a left foraminal to lateral disc protrusion and osteophyte complex superimposed on
 a disc bulge and endplate osteophytosis. There is severe bilateral facet arthropathy. There is mild 
right and moderate left foraminal stenosis. There is mild central canal stenosis and narrowing of the
 thecal sac due to prominent epidural fat.



IMPRESSION: Multilevel degenerative change involving the lumbar spine, described in detail above. Thi
s results in significant stenosis at the aforementioned levels. The left foraminal stenosis is most s
ignificant at L2-L3. The right foraminal stenosis is most significant at L4-L5. The central canal marli
nosis is most significant at L4-L5.



Electronically signed by: Tawana May MD (2/15/2022 2:35 PM) [id number]